# Patient Record
Sex: FEMALE | Race: WHITE | Employment: OTHER | URBAN - METROPOLITAN AREA
[De-identification: names, ages, dates, MRNs, and addresses within clinical notes are randomized per-mention and may not be internally consistent; named-entity substitution may affect disease eponyms.]

---

## 2017-06-14 ENCOUNTER — HOSPITAL ENCOUNTER (OUTPATIENT)
Dept: GENERAL RADIOLOGY | Age: 67
Discharge: HOME | End: 2017-06-14
Payer: COMMERCIAL

## 2017-06-14 ENCOUNTER — HOSPITAL ENCOUNTER (OUTPATIENT)
Age: 67
Discharge: HOME | End: 2017-06-14
Payer: COMMERCIAL

## 2017-06-14 DIAGNOSIS — J40 BRONCHITIS: ICD-10-CM

## 2017-06-14 PROCEDURE — 71020 XR CHEST STANDARD TWO VW: CPT

## 2017-07-13 ENCOUNTER — HOSPITAL ENCOUNTER (OUTPATIENT)
Dept: CT IMAGING | Age: 67
Discharge: HOME | End: 2017-07-13
Payer: COMMERCIAL

## 2017-07-13 DIAGNOSIS — J90 PLEURAL EFFUSION: ICD-10-CM

## 2017-07-13 PROCEDURE — 71250 CT THORAX DX C-: CPT

## 2018-01-29 ENCOUNTER — HOSPITAL ENCOUNTER (OUTPATIENT)
Dept: CT IMAGING | Age: 68
Discharge: HOME | End: 2018-01-29
Payer: MEDICARE

## 2018-01-29 DIAGNOSIS — R91.1 PULMONARY NODULE: ICD-10-CM

## 2018-01-29 PROCEDURE — 71250 CT THORAX DX C-: CPT

## 2018-01-31 ENCOUNTER — HOSPITAL ENCOUNTER (OUTPATIENT)
Age: 68
Discharge: HOME | End: 2018-01-31
Payer: MEDICARE

## 2018-01-31 LAB
ABSOLUTE EOS #: 0.12 K/UL (ref 0–0.4)
ABSOLUTE IMMATURE GRANULOCYTE: ABNORMAL K/UL (ref 0–0.3)
ABSOLUTE LYMPH #: 3.29 K/UL (ref 1–4.8)
ABSOLUTE MONO #: 0.25 K/UL (ref 0.2–0.8)
ALT SERPL-CCNC: 27 U/L (ref 5–33)
ANION GAP SERPL CALCULATED.3IONS-SCNC: 9 MMOL/L (ref 9–17)
AST SERPL-CCNC: 22 U/L
BASOPHILS # BLD: 0 %
BASOPHILS ABSOLUTE: 0 K/UL (ref 0–0.2)
BILIRUBIN URINE: NEGATIVE
BUN BLDV-MCNC: 15 MG/DL (ref 8–23)
BUN/CREAT BLD: 26 (ref 9–20)
CALCIUM SERPL-MCNC: 9.1 MG/DL (ref 8.6–10.4)
CHLORIDE BLD-SCNC: 102 MMOL/L (ref 98–107)
CHOLESTEROL, FASTING: 205 MG/DL
CHOLESTEROL/HDL RATIO: 3.3
CO2: 30 MMOL/L (ref 20–31)
COLOR: YELLOW
COMMENT UA: NORMAL
CREAT SERPL-MCNC: 0.57 MG/DL (ref 0.5–0.9)
DIFFERENTIAL TYPE: ABNORMAL
EOSINOPHILS RELATIVE PERCENT: 2 % (ref 1–4)
GFR AFRICAN AMERICAN: >60 ML/MIN
GFR NON-AFRICAN AMERICAN: >60 ML/MIN
GFR SERPL CREATININE-BSD FRML MDRD: ABNORMAL ML/MIN/{1.73_M2}
GFR SERPL CREATININE-BSD FRML MDRD: ABNORMAL ML/MIN/{1.73_M2}
GGT: 54 U/L (ref 5–36)
GLUCOSE FASTING: 93 MG/DL (ref 70–99)
GLUCOSE URINE: NEGATIVE
HCT VFR BLD CALC: 49.6 % (ref 36–46)
HDLC SERPL-MCNC: 62 MG/DL
HEMOGLOBIN: 16.7 G/DL (ref 12–16)
IMMATURE GRANULOCYTES: ABNORMAL %
KETONES, URINE: NEGATIVE
LDL CHOLESTEROL: 124 MG/DL (ref 0–130)
LEUKOCYTE ESTERASE, URINE: NEGATIVE
LYMPHOCYTES # BLD: 53 % (ref 24–44)
MCH RBC QN AUTO: 31.1 PG (ref 26–34)
MCHC RBC AUTO-ENTMCNC: 33.7 G/DL (ref 31–37)
MCV RBC AUTO: 92.2 FL (ref 80–100)
MONOCYTES # BLD: 4 % (ref 1–7)
NITRITE, URINE: NEGATIVE
NRBC AUTOMATED: ABNORMAL PER 100 WBC
PDW BLD-RTO: 12.4 % (ref 11.5–14.5)
PH UA: 5.5 (ref 5–8)
PLATELET # BLD: 321 K/UL (ref 130–400)
PLATELET ESTIMATE: ABNORMAL
PMV BLD AUTO: ABNORMAL FL (ref 6–12)
POTASSIUM SERPL-SCNC: 4.3 MMOL/L (ref 3.7–5.3)
PROTEIN UA: NEGATIVE
RBC # BLD: 5.38 M/UL (ref 4–5.2)
RBC # BLD: ABNORMAL 10*6/UL
SEG NEUTROPHILS: 41 % (ref 36–66)
SEGMENTED NEUTROPHILS ABSOLUTE COUNT: 2.54 K/UL (ref 1.8–7.7)
SODIUM BLD-SCNC: 141 MMOL/L (ref 135–144)
SPECIFIC GRAVITY UA: 1.01 (ref 1–1.03)
TRIGLYCERIDE, FASTING: 94 MG/DL
TURBIDITY: CLEAR
URINE HGB: NEGATIVE
UROBILINOGEN, URINE: NORMAL
VLDLC SERPL CALC-MCNC: ABNORMAL MG/DL (ref 1–30)
WBC # BLD: 6.2 K/UL (ref 3.5–11)
WBC # BLD: ABNORMAL 10*3/UL

## 2018-01-31 PROCEDURE — 84460 ALANINE AMINO (ALT) (SGPT): CPT

## 2018-01-31 PROCEDURE — 84450 TRANSFERASE (AST) (SGOT): CPT

## 2018-01-31 PROCEDURE — 80048 BASIC METABOLIC PNL TOTAL CA: CPT

## 2018-01-31 PROCEDURE — 81003 URINALYSIS AUTO W/O SCOPE: CPT

## 2018-01-31 PROCEDURE — 80061 LIPID PANEL: CPT

## 2018-01-31 PROCEDURE — 36415 COLL VENOUS BLD VENIPUNCTURE: CPT

## 2018-01-31 PROCEDURE — 82977 ASSAY OF GGT: CPT

## 2018-01-31 PROCEDURE — 85025 COMPLETE CBC W/AUTO DIFF WBC: CPT

## 2018-09-15 ENCOUNTER — HOSPITAL ENCOUNTER (OUTPATIENT)
Dept: CT IMAGING | Age: 68
Discharge: HOME | End: 2018-09-17
Payer: MEDICARE

## 2018-09-15 DIAGNOSIS — R91.1 LUNG NODULE: ICD-10-CM

## 2018-09-15 PROCEDURE — 71250 CT THORAX DX C-: CPT

## 2019-03-01 ENCOUNTER — HOSPITAL ENCOUNTER (OUTPATIENT)
Dept: CT IMAGING | Age: 69
Discharge: HOME | End: 2019-03-03
Payer: MEDICARE

## 2019-03-01 DIAGNOSIS — R91.1 LUNG NODULE: ICD-10-CM

## 2019-03-01 PROCEDURE — 71250 CT THORAX DX C-: CPT

## 2023-05-01 ENCOUNTER — HOSPITAL ENCOUNTER (INPATIENT)
Age: 73
LOS: 3 days | Discharge: HOME | DRG: 291 | End: 2023-05-04
Attending: EMERGENCY MEDICINE | Admitting: INTERNAL MEDICINE
Payer: COMMERCIAL

## 2023-05-01 ENCOUNTER — APPOINTMENT (OUTPATIENT)
Dept: GENERAL RADIOLOGY | Age: 73
DRG: 291 | End: 2023-05-01
Payer: COMMERCIAL

## 2023-05-01 DIAGNOSIS — I48.91 ATRIAL FIBRILLATION WITH RVR (HCC): ICD-10-CM

## 2023-05-01 DIAGNOSIS — I50.9 ACUTE ON CHRONIC CONGESTIVE HEART FAILURE, UNSPECIFIED HEART FAILURE TYPE (HCC): Primary | ICD-10-CM

## 2023-05-01 PROBLEM — Z72.0 TOBACCO ABUSE: Status: ACTIVE | Noted: 2023-05-01

## 2023-05-01 PROBLEM — E78.2 MIXED HYPERLIPIDEMIA: Status: ACTIVE | Noted: 2023-05-01

## 2023-05-01 PROBLEM — R71.8 HIGH HEMATOCRIT: Status: ACTIVE | Noted: 2023-05-01

## 2023-05-01 PROBLEM — I10 PRIMARY HYPERTENSION: Status: ACTIVE | Noted: 2023-05-01

## 2023-05-01 PROBLEM — J42 CHRONIC BRONCHITIS (HCC): Status: ACTIVE | Noted: 2023-05-01

## 2023-05-01 PROBLEM — E66.01 CLASS 3 SEVERE OBESITY DUE TO EXCESS CALORIES WITH SERIOUS COMORBIDITY AND BODY MASS INDEX (BMI) OF 40.0 TO 44.9 IN ADULT (HCC): Status: ACTIVE | Noted: 2023-05-01

## 2023-05-01 PROBLEM — I50.31 ACUTE DIASTOLIC HEART FAILURE (HCC): Status: ACTIVE | Noted: 2023-05-01

## 2023-05-01 LAB
ABSOLUTE EOS #: 0.07 K/UL (ref 0–0.44)
ABSOLUTE IMMATURE GRANULOCYTE: 0.02 K/UL (ref 0–0.3)
ABSOLUTE LYMPH #: 2.06 K/UL (ref 1.1–3.7)
ABSOLUTE MONO #: 0.59 K/UL (ref 0.1–1.2)
ALBUMIN SERPL-MCNC: 3.5 G/DL (ref 3.5–5.2)
ALP SERPL-CCNC: 138 U/L (ref 35–104)
ALT SERPL-CCNC: 82 U/L (ref 5–33)
ANION GAP SERPL CALCULATED.3IONS-SCNC: 6 MMOL/L (ref 9–17)
AST SERPL-CCNC: 32 U/L
BASOPHILS # BLD: 1 % (ref 0–2)
BASOPHILS ABSOLUTE: 0.05 K/UL (ref 0–0.2)
BILIRUB DIRECT SERPL-MCNC: 0.3 MG/DL
BILIRUB INDIRECT SERPL-MCNC: 0.5 MG/DL (ref 0–1)
BILIRUB SERPL-MCNC: 0.8 MG/DL (ref 0.3–1.2)
BNP SERPL-MCNC: 3613 PG/ML
BUN SERPL-MCNC: 14 MG/DL (ref 8–23)
BUN/CREAT BLD: 17 (ref 9–20)
CALCIUM SERPL-MCNC: 9 MG/DL (ref 8.6–10.4)
CHLORIDE SERPL-SCNC: 104 MMOL/L (ref 98–107)
CO2 SERPL-SCNC: 29 MMOL/L (ref 20–31)
CREAT SERPL-MCNC: 0.83 MG/DL (ref 0.5–0.9)
EOSINOPHILS RELATIVE PERCENT: 1 % (ref 1–4)
GFR SERPL CREATININE-BSD FRML MDRD: >60 ML/MIN/1.73M2
GLUCOSE SERPL-MCNC: 120 MG/DL (ref 70–99)
HCT VFR BLD AUTO: 51 % (ref 36.3–47.1)
HGB BLD-MCNC: 16 G/DL (ref 11.9–15.1)
IMMATURE GRANULOCYTES: 0 %
LYMPHOCYTES # BLD: 25 % (ref 24–43)
MAGNESIUM SERPL-MCNC: 1.9 MG/DL (ref 1.6–2.6)
MCH RBC QN AUTO: 30.8 PG (ref 25.2–33.5)
MCHC RBC AUTO-ENTMCNC: 31.4 G/DL (ref 28.4–34.8)
MCV RBC AUTO: 98.1 FL (ref 82.6–102.9)
MONOCYTES # BLD: 7 % (ref 3–12)
NRBC AUTOMATED: 0 PER 100 WBC
PDW BLD-RTO: 13.6 % (ref 11.8–14.4)
PLATELET # BLD AUTO: 291 K/UL (ref 138–453)
PMV BLD AUTO: 9.2 FL (ref 8.1–13.5)
POTASSIUM SERPL-SCNC: 4.4 MMOL/L (ref 3.7–5.3)
PROT SERPL-MCNC: 6.5 G/DL (ref 6.4–8.3)
RBC # BLD: 5.2 M/UL (ref 3.95–5.11)
SEG NEUTROPHILS: 66 % (ref 36–65)
SEGMENTED NEUTROPHILS ABSOLUTE COUNT: 5.56 K/UL (ref 1.5–8.1)
SODIUM SERPL-SCNC: 139 MMOL/L (ref 135–144)
TROPONIN I SERPL DL<=0.01 NG/ML-MCNC: 11 NG/L (ref 0–14)
TROPONIN I SERPL DL<=0.01 NG/ML-MCNC: 12 NG/L (ref 0–14)
TSH SERPL-ACNC: 1.91 UIU/ML (ref 0.3–5)
WBC # BLD AUTO: 8.4 K/UL (ref 3.5–11.3)

## 2023-05-01 PROCEDURE — 71045 X-RAY EXAM CHEST 1 VIEW: CPT

## 2023-05-01 PROCEDURE — 83880 ASSAY OF NATRIURETIC PEPTIDE: CPT

## 2023-05-01 PROCEDURE — 80048 BASIC METABOLIC PNL TOTAL CA: CPT

## 2023-05-01 PROCEDURE — 84484 ASSAY OF TROPONIN QUANT: CPT

## 2023-05-01 PROCEDURE — 2580000003 HC RX 258: Performed by: INTERNAL MEDICINE

## 2023-05-01 PROCEDURE — 2060000000 HC ICU INTERMEDIATE R&B

## 2023-05-01 PROCEDURE — 85025 COMPLETE CBC W/AUTO DIFF WBC: CPT

## 2023-05-01 PROCEDURE — 6360000002 HC RX W HCPCS: Performed by: EMERGENCY MEDICINE

## 2023-05-01 PROCEDURE — 6360000002 HC RX W HCPCS: Performed by: INTERNAL MEDICINE

## 2023-05-01 PROCEDURE — 84443 ASSAY THYROID STIM HORMONE: CPT

## 2023-05-01 PROCEDURE — 80076 HEPATIC FUNCTION PANEL: CPT

## 2023-05-01 PROCEDURE — 83735 ASSAY OF MAGNESIUM: CPT

## 2023-05-01 PROCEDURE — 99285 EMERGENCY DEPT VISIT HI MDM: CPT

## 2023-05-01 PROCEDURE — 93005 ELECTROCARDIOGRAM TRACING: CPT | Performed by: EMERGENCY MEDICINE

## 2023-05-01 PROCEDURE — 99233 SBSQ HOSP IP/OBS HIGH 50: CPT | Performed by: INTERNAL MEDICINE

## 2023-05-01 PROCEDURE — 6370000000 HC RX 637 (ALT 250 FOR IP): Performed by: INTERNAL MEDICINE

## 2023-05-01 RX ORDER — POTASSIUM CHLORIDE 7.45 MG/ML
10 INJECTION INTRAVENOUS PRN
Status: DISCONTINUED | OUTPATIENT
Start: 2023-05-01 | End: 2023-05-04 | Stop reason: HOSPADM

## 2023-05-01 RX ORDER — ACETAMINOPHEN 325 MG/1
650 TABLET ORAL EVERY 6 HOURS PRN
Status: DISCONTINUED | OUTPATIENT
Start: 2023-05-01 | End: 2023-05-04 | Stop reason: HOSPADM

## 2023-05-01 RX ORDER — POLYETHYLENE GLYCOL 3350 17 G/17G
17 POWDER, FOR SOLUTION ORAL DAILY PRN
Status: DISCONTINUED | OUTPATIENT
Start: 2023-05-01 | End: 2023-05-04 | Stop reason: HOSPADM

## 2023-05-01 RX ORDER — DIGOXIN 0.25 MG/ML
250 INJECTION INTRAMUSCULAR; INTRAVENOUS
Status: COMPLETED | OUTPATIENT
Start: 2023-05-01 | End: 2023-05-01

## 2023-05-01 RX ORDER — SODIUM CHLORIDE 0.9 % (FLUSH) 0.9 %
5-40 SYRINGE (ML) INJECTION EVERY 12 HOURS SCHEDULED
Status: DISCONTINUED | OUTPATIENT
Start: 2023-05-01 | End: 2023-05-04 | Stop reason: HOSPADM

## 2023-05-01 RX ORDER — ATORVASTATIN CALCIUM 20 MG/1
20 TABLET, FILM COATED ORAL DAILY
COMMUNITY

## 2023-05-01 RX ORDER — ATORVASTATIN CALCIUM 20 MG/1
20 TABLET, FILM COATED ORAL DAILY
Status: DISCONTINUED | OUTPATIENT
Start: 2023-05-01 | End: 2023-05-04 | Stop reason: HOSPADM

## 2023-05-01 RX ORDER — LISINOPRIL 5 MG/1
5 TABLET ORAL DAILY
Status: DISCONTINUED | OUTPATIENT
Start: 2023-05-01 | End: 2023-05-01

## 2023-05-01 RX ORDER — IPRATROPIUM BROMIDE AND ALBUTEROL SULFATE 2.5; .5 MG/3ML; MG/3ML
1 SOLUTION RESPIRATORY (INHALATION) EVERY 4 HOURS PRN
Status: DISCONTINUED | OUTPATIENT
Start: 2023-05-01 | End: 2023-05-01

## 2023-05-01 RX ORDER — SODIUM CHLORIDE 9 MG/ML
INJECTION, SOLUTION INTRAVENOUS PRN
Status: DISCONTINUED | OUTPATIENT
Start: 2023-05-01 | End: 2023-05-04 | Stop reason: HOSPADM

## 2023-05-01 RX ORDER — ACETAMINOPHEN 650 MG/1
650 SUPPOSITORY RECTAL EVERY 6 HOURS PRN
Status: DISCONTINUED | OUTPATIENT
Start: 2023-05-01 | End: 2023-05-04 | Stop reason: HOSPADM

## 2023-05-01 RX ORDER — SODIUM CHLORIDE 0.9 % (FLUSH) 0.9 %
5-40 SYRINGE (ML) INJECTION PRN
Status: DISCONTINUED | OUTPATIENT
Start: 2023-05-01 | End: 2023-05-04 | Stop reason: HOSPADM

## 2023-05-01 RX ORDER — ACETAMINOPHEN 500 MG
500 TABLET ORAL EVERY 6 HOURS PRN
COMMUNITY

## 2023-05-01 RX ORDER — METOPROLOL TARTRATE 100 MG/1
100 TABLET ORAL 2 TIMES DAILY
Status: ON HOLD | COMMUNITY
End: 2023-05-04 | Stop reason: HOSPADM

## 2023-05-01 RX ORDER — ALBUTEROL SULFATE 90 UG/1
2 AEROSOL, METERED RESPIRATORY (INHALATION) EVERY 4 HOURS PRN
Status: DISCONTINUED | OUTPATIENT
Start: 2023-05-01 | End: 2023-05-04 | Stop reason: HOSPADM

## 2023-05-01 RX ORDER — LORAZEPAM 0.5 MG/1
0.5 TABLET ORAL EVERY 4 HOURS PRN
Status: DISCONTINUED | OUTPATIENT
Start: 2023-05-01 | End: 2023-05-04 | Stop reason: HOSPADM

## 2023-05-01 RX ORDER — POTASSIUM CHLORIDE 20 MEQ/1
40 TABLET, EXTENDED RELEASE ORAL PRN
Status: DISCONTINUED | OUTPATIENT
Start: 2023-05-01 | End: 2023-05-04 | Stop reason: HOSPADM

## 2023-05-01 RX ORDER — ENOXAPARIN SODIUM 150 MG/ML
1 INJECTION SUBCUTANEOUS 2 TIMES DAILY
Status: DISCONTINUED | OUTPATIENT
Start: 2023-05-01 | End: 2023-05-02

## 2023-05-01 RX ORDER — FUROSEMIDE 10 MG/ML
40 INJECTION INTRAMUSCULAR; INTRAVENOUS 3 TIMES DAILY
Status: DISCONTINUED | OUTPATIENT
Start: 2023-05-01 | End: 2023-05-02

## 2023-05-01 RX ORDER — ALBUTEROL SULFATE 90 UG/1
2 AEROSOL, METERED RESPIRATORY (INHALATION) EVERY 6 HOURS PRN
COMMUNITY

## 2023-05-01 RX ORDER — FUROSEMIDE 10 MG/ML
40 INJECTION INTRAMUSCULAR; INTRAVENOUS 2 TIMES DAILY
Status: DISCONTINUED | OUTPATIENT
Start: 2023-05-01 | End: 2023-05-01

## 2023-05-01 RX ORDER — FUROSEMIDE 10 MG/ML
40 INJECTION INTRAMUSCULAR; INTRAVENOUS ONCE
Status: COMPLETED | OUTPATIENT
Start: 2023-05-01 | End: 2023-05-01

## 2023-05-01 RX ORDER — ONDANSETRON 4 MG/1
4 TABLET, ORALLY DISINTEGRATING ORAL EVERY 8 HOURS PRN
Status: DISCONTINUED | OUTPATIENT
Start: 2023-05-01 | End: 2023-05-04 | Stop reason: HOSPADM

## 2023-05-01 RX ORDER — METOPROLOL TARTRATE 100 MG/1
100 TABLET ORAL 2 TIMES DAILY
Status: DISCONTINUED | OUTPATIENT
Start: 2023-05-01 | End: 2023-05-02

## 2023-05-01 RX ORDER — METOPROLOL TARTRATE 5 MG/5ML
5 INJECTION INTRAVENOUS ONCE
Status: DISCONTINUED | OUTPATIENT
Start: 2023-05-01 | End: 2023-05-01

## 2023-05-01 RX ORDER — DIGOXIN 125 MCG
125 TABLET ORAL DAILY
Status: DISCONTINUED | OUTPATIENT
Start: 2023-05-02 | End: 2023-05-02

## 2023-05-01 RX ORDER — MAGNESIUM SULFATE IN WATER 40 MG/ML
2000 INJECTION, SOLUTION INTRAVENOUS PRN
Status: DISCONTINUED | OUTPATIENT
Start: 2023-05-01 | End: 2023-05-04 | Stop reason: HOSPADM

## 2023-05-01 RX ORDER — ENOXAPARIN SODIUM 100 MG/ML
30 INJECTION SUBCUTANEOUS 2 TIMES DAILY
Status: DISCONTINUED | OUTPATIENT
Start: 2023-05-01 | End: 2023-05-01

## 2023-05-01 RX ORDER — ONDANSETRON 2 MG/ML
4 INJECTION INTRAMUSCULAR; INTRAVENOUS EVERY 6 HOURS PRN
Status: DISCONTINUED | OUTPATIENT
Start: 2023-05-01 | End: 2023-05-03

## 2023-05-01 RX ADMIN — METOPROLOL 100 MG: 100 TABLET ORAL at 20:00

## 2023-05-01 RX ADMIN — SODIUM CHLORIDE, PRESERVATIVE FREE 10 ML: 5 INJECTION INTRAVENOUS at 20:00

## 2023-05-01 RX ADMIN — DIGOXIN 250 MCG: 0.25 INJECTION INTRAMUSCULAR; INTRAVENOUS at 14:46

## 2023-05-01 RX ADMIN — EMPAGLIFLOZIN 10 MG: 10 TABLET, FILM COATED ORAL at 15:13

## 2023-05-01 RX ADMIN — DIGOXIN 250 MCG: 0.25 INJECTION INTRAMUSCULAR; INTRAVENOUS at 16:44

## 2023-05-01 RX ADMIN — FUROSEMIDE 40 MG: 10 INJECTION, SOLUTION INTRAMUSCULAR; INTRAVENOUS at 17:37

## 2023-05-01 RX ADMIN — ENOXAPARIN SODIUM 120 MG: 150 INJECTION SUBCUTANEOUS at 20:00

## 2023-05-01 RX ADMIN — FUROSEMIDE 40 MG: 10 INJECTION, SOLUTION INTRAMUSCULAR; INTRAVENOUS at 12:57

## 2023-05-01 ASSESSMENT — ENCOUNTER SYMPTOMS
VOMITING: 0
DIARRHEA: 0
EYE DISCHARGE: 0
SORE THROAT: 0
NAUSEA: 0
RHINORRHEA: 0
SHORTNESS OF BREATH: 1
EYE REDNESS: 0
COLOR CHANGE: 0
COUGH: 0

## 2023-05-01 ASSESSMENT — PAIN - FUNCTIONAL ASSESSMENT: PAIN_FUNCTIONAL_ASSESSMENT: NONE - DENIES PAIN

## 2023-05-01 NOTE — PROGRESS NOTES
[x] Medication Reconciliation was completed and the patient's home medication list was verified. The Med List Status is \"Complete\". The following sources were used to assist with Medication Reconciliation:    [] Patient had a list of medications which was transcribed into the EHR. [] Patient provided bottles of their medications    [x] Home medications reviewed and confirmed with pt    [] Contacted patient's pharmacy to confirm home medications    [] Contacted patient's physician office to confirm home medications    [x] Medical Records from another facility and/or Care Everywhere were reviewed  - verified with care everywhere for medication doses.

## 2023-05-01 NOTE — ED PROVIDER NOTES
EMERGENCY DEPARTMENT ENCOUNTER    Pt Name: Fernanda Wolfe  MRN: [id number]  [place] 1950  Date of evaluation: 5/1/23  CHIEF COMPLAINT       Chief Complaint   Patient presents with    Shortness of Breath     States smokes a pack of cig a day    Atrial Fibrillation     Diagnosed  March 17, taking lopressor and eliquis    Swelling     Legs/hips     HISTORY OF PRESENT ILLNESS   This is a 74-year-old female, the patient is a pack-a-day smoker, she presents today with complaints of shortness of breath. Patient describes her shortness of breath as severe, without any specific alleviating factors. She states that over the past few days she has had to sleep sitting up, she states that she also has some paroxysmal nocturnal dyspnea. REVIEW OF SYSTEMS     Review of Systems   Constitutional:  Negative for chills and fever. HENT:  Negative for rhinorrhea and sore throat. Eyes:  Negative for discharge, redness and visual disturbance. Respiratory:  Positive for shortness of breath. Negative for cough. Cardiovascular:  Positive for leg swelling. Negative for chest pain and palpitations. Gastrointestinal:  Negative for diarrhea, nausea and vomiting. Genitourinary:  Negative for dysuria and hematuria. Musculoskeletal:  Negative for arthralgias, myalgias and neck pain. Skin:  Negative for color change and rash. Neurological:  Negative for seizures, weakness and headaches. Psychiatric/Behavioral:  Negative for hallucinations, self-injury and suicidal ideas.     PASTMEDICAL HISTORY     Past Medical History:   Diagnosis Date    A-fib ([facility] [address]     Hyperlipidemia     Hypertension      Past Problem List  Patient Active Problem List   Diagnosis Code    Decompensated heart failure ([facility] [address] I50.9     SURGICAL HISTORY       Past Surgical History:   Procedure Laterality Date    HERNIA REPAIR      HYSTERECTOMY (CERVIX STATUS UNKNOWN)       CURRENT MEDICATIONS       Previous Medications    No medications on file

## 2023-05-01 NOTE — PROGRESS NOTES
Nutrition Education    Educated on Heart failure education given at bedside, talked about importance of taking their weight daily, fluid restriction and sodium restriction. Learners: Patient  Readiness: Eager  Method: Explanation and Handout  Response: Verbalizes Understanding  Contact name and number provided.     Lauren De Anda RD  Contact Number: [phone]

## 2023-05-01 NOTE — PLAN OF CARE
Pt admit with CHF exacerbation. Pt has 3+ edema from BLE up to waist, and is on IV lasix for diuresis. Pt also in a-fib RVR with a HR from 110-150, and 2 doses of IV digoxin were given. HR now 80-100s. Pt does have a history of A-fib, and was on eliquis at home but on lovenox here in case a procedure needs to be done. Pt up per self, and voiding in hat in toilet for I&O documentation. Problem: Cardiovascular - Adult  Goal: Maintains optimal cardiac output and hemodynamic stability  Outcome: Progressing  Pt has good output d/t IV diuretics. Pt has diminished breath sounds on the right, but oxygen saturation is good on room air. Will continue to monitor.

## 2023-05-01 NOTE — RT PROTOCOL NOTE
RT Inhaler-Nebulizer Bronchodilator Protocol Note    There is a bronchodilator order in the chart from a provider indicating to follow the RT Bronchodilator Protocol and there is an Initiate RT Inhaler-Nebulizer Bronchodilator Protocol order as well (see protocol at bottom of note). CXR Findings:  XR CHEST PORTABLE    Result Date: 5/1/2023  Cardiomegaly Right-sided pleural effusion No overt pulmonary edema       The findings from the last RT Protocol Assessment were as follows:   History Pulmonary Disease: Smoker 15 pack years or more  Respiratory Pattern: Regular pattern and RR 12-20 bpm  Breath Sounds: Slightly diminished and/or crackles  Cough: Strong, spontaneous, non-productive  Indication for Bronchodilator Therapy:    Bronchodilator Assessment Score: 3    Aerosolized bronchodilator medication orders have been revised according to the RT Inhaler-Nebulizer Bronchodilator Protocol below. Respiratory Therapist to perform RT Therapy Protocol Assessment initially then follow the protocol. Repeat RT Therapy Protocol Assessment PRN for score 0-3 or on second treatment, BID, and PRN for scores above 3. No Indications - adjust the frequency to every 6 hours PRN wheezing or bronchospasm, if no treatments needed after 48 hours then discontinue using Per Protocol order mode. If indication present, adjust the RT bronchodilator orders based on the Bronchodilator Assessment Score as indicated below. Use Inhaler orders unless patient has one or more of the following: on home nebulizer, not able to hold breath for 10 seconds, is not alert and oriented, cannot activate and use MDI correctly, or respiratory rate 25 breaths per minute or more, then use the equivalent nebulizer order(s) with same Frequency and PRN reasons based on the score. If a patient is on this medication at home then do not decrease Frequency below that used at home.     0-3 - enter or revise RT bronchodilator order(s) to equivalent RT

## 2023-05-01 NOTE — H&P
[facility]  Office: [address], DO, Juliana Solid, DO, Kerry Shall, DO, Zhane Gosselin Blood, DO, Nicole Holder MD, Angie Milan MD, Sal Weir MD, Quinten Del Cid MD,  Freddy Torres MD, Kitty Palm MD, Heavenly Sales, DO, Connor Borges MD,  Samra Reyez MD, Danika Lamar MD, Maira Perez DO, Megan Luna MD, Dilma Gutierrez MD, Cuco Kiser DO, Kaden Cedeño MD, Shonda William MD, Gerda Childress MD, [address] [facility], MD,  Dionne Bernabe DO, Harpreet Bautista MD,  Elzbieta Hein CNP,  Carolina Delatorre, CNP, James Mcelroy, CNP, Xi Franco, CNP,  Romelia Senior, [facility], Paula Paniagua, CNP, Stefano Gutierrez, CNP, Nhi Gudino, CNP, Blair Meek, CNP, Dmitry Ridley, CNP, Ant Cavanaugh PAHAL, Bing Nair, CNS, Ti Felipe, CNP, Kelsi Serrano, [facility]    HISTORY AND PHYSICAL EXAMINATION            Date:   5/1/2023  Patient name:  Laquita Villa  Date of admission:  5/1/2023 10:33 AM  MRN:   [id number]  Account:  [de-identified]  YOB: 1950  PCP:    Tristen Ulloa DO  Room:   [address] [place] [address]  Code Status:    Full Code    Chief Complaint:     Chief Complaint   Patient presents with    Shortness of Breath     States smokes a pack of cig a day    Atrial Fibrillation     Diagnosed  March 17, taking lopressor and eliquis    Swelling     Legs/hips       History Obtained From:     patient, electronic medical record    History of Present Illness:     Laquita Villa is a 81 y.o. for evaluation of shortness of breath and bilateral lower exteremity edema. Symptoms started on April 21st. She says she weighs herself daily and noticed a 25 pound weight gain and is now having dyspnea with exertion and now has to sleep in a recliner. . She says she can only walk about 5-10 feet at home before having to stop and catch her breath.  Symptoms are improved at rest. She does not have any chest

## 2023-05-01 NOTE — PROGRESS NOTES
Patient transferred to 1004 by cart . Belongings and medications with the patient. Family aware of the transfer. Condition satisfactory upon transfer.

## 2023-05-01 NOTE — ED NOTES
ED to inpatient nurses report     Chief Complaint   Patient presents with    Shortness of Breath     States smokes a pack of cig a day    Atrial Fibrillation     Diagnosed  March 17, taking lopressor and eliquis    Swelling     Legs/hips      Present to ED from home  LOC: alert and orientated to name, place, date  Vital signs   Vitals:    05/01/23 1027 05/01/23 1152 05/01/23 1153 05/01/23 1225   BP: (!) 140/116      Pulse: 55 (!) 108  (!) 118   Resp: 20      Temp: 98.1 °F (36.7 °C)      TempSrc: Oral      SpO2: 92% 93% 94% 94%   Weight: 265 lb (120.2 kg)      Height: 5' 7\" (1.702 m)         Oxygen Baseline room air    Current needs required none   SEPSIS:    [] Lactate X 2 ordered (Yes or No)  [] Antibiotics given (Yes or No)  [] IV Fluids ordered (Yes or No)             [] 2nd IV completed (Yes or No)  [] Hourly Vital Signs (Validated)  [] Outstanding Orders:     LDAs:   Peripheral IV 05/01/23 Left Antecubital (Active)   Site Assessment Clean, dry & intact 05/01/23 1058     Mobility: Independent  Fall Risk:    Pending ED orders: none  Present condition: stable  Code Status: unknown  Consults: IP CONSULT TO HOSPITALIST  IP CONSULT TO HEART FAILURE NURSE/COORDINATOR  IP CONSULT TO DIETITIAN  []  Hospitalist  Completed  [] yes [] no Who:   []  Medicine  Completed  [] yes [] No Who:   []  Cardiology  Completed  [] yes [] No Who:   []  GI   Completed  [] yes [] No Who:   []  Neurology  Completed  [] yes [] No Who:   []  Nephrology Completed  [] yes [] No Who:    []  Vascular  Completed  [] yes [] No Who:   []  Ortho  Completed  [] yes [] No Who:     []  Surgery  Completed  [] yes [] No Who:    []  Urology  Completed  [] yes [] No Who:    []  CT Surgery Completed  [] yes [] No Who:   []  Podiatry  Completed  [] yes [] No Who:    []  Other    Completed  [] yes [] No Who:  Interventions: IV, blood work, XR, meds  Important Events: SOB upon exertion        Electronically signed by Aureliano Muller RN on 5/1/2023 at 12:43 PM

## 2023-05-02 ENCOUNTER — APPOINTMENT (OUTPATIENT)
Dept: NUCLEAR MEDICINE | Age: 73
DRG: 291 | End: 2023-05-02
Payer: COMMERCIAL

## 2023-05-02 LAB
ABSOLUTE EOS #: 0.13 K/UL (ref 0–0.44)
ABSOLUTE IMMATURE GRANULOCYTE: 0.02 K/UL (ref 0–0.3)
ABSOLUTE LYMPH #: 2.65 K/UL (ref 1.1–3.7)
ABSOLUTE MONO #: 0.78 K/UL (ref 0.1–1.2)
ANION GAP SERPL CALCULATED.3IONS-SCNC: 8 MMOL/L (ref 9–17)
BASOPHILS # BLD: 1 % (ref 0–2)
BASOPHILS ABSOLUTE: 0.06 K/UL (ref 0–0.2)
BUN SERPL-MCNC: 14 MG/DL (ref 8–23)
BUN/CREAT BLD: 18 (ref 9–20)
CALCIUM SERPL-MCNC: 8.8 MG/DL (ref 8.6–10.4)
CHLORIDE SERPL-SCNC: 105 MMOL/L (ref 98–107)
CO2 SERPL-SCNC: 31 MMOL/L (ref 20–31)
CREAT SERPL-MCNC: 0.77 MG/DL (ref 0.5–0.9)
EKG ATRIAL RATE: 73 BPM
EKG Q-T INTERVAL: 286 MS
EKG QRS DURATION: 84 MS
EKG QTC CALCULATION (BAZETT): 392 MS
EKG R AXIS: 95 DEGREES
EKG T AXIS: 18 DEGREES
EKG VENTRICULAR RATE: 113 BPM
EOSINOPHILS RELATIVE PERCENT: 2 % (ref 1–4)
GFR SERPL CREATININE-BSD FRML MDRD: >60 ML/MIN/1.73M2
GLUCOSE SERPL-MCNC: 94 MG/DL (ref 70–99)
HCT VFR BLD AUTO: 47.4 % (ref 36.3–47.1)
HGB BLD-MCNC: 15.1 G/DL (ref 11.9–15.1)
IMMATURE GRANULOCYTES: 0 %
LV EF: 55 %
LVEF MODALITY: NORMAL
LYMPHOCYTES # BLD: 36 % (ref 24–43)
MAGNESIUM SERPL-MCNC: 2 MG/DL (ref 1.6–2.6)
MCH RBC QN AUTO: 30.8 PG (ref 25.2–33.5)
MCHC RBC AUTO-ENTMCNC: 31.9 G/DL (ref 28.4–34.8)
MCV RBC AUTO: 96.5 FL (ref 82.6–102.9)
MONOCYTES # BLD: 11 % (ref 3–12)
NRBC AUTOMATED: 0 PER 100 WBC
PDW BLD-RTO: 13.4 % (ref 11.8–14.4)
PLATELET # BLD AUTO: 243 K/UL (ref 138–453)
PMV BLD AUTO: 8.9 FL (ref 8.1–13.5)
POTASSIUM SERPL-SCNC: 3.7 MMOL/L (ref 3.7–5.3)
RBC # BLD: 4.91 M/UL (ref 3.95–5.11)
SEG NEUTROPHILS: 50 % (ref 36–65)
SEGMENTED NEUTROPHILS ABSOLUTE COUNT: 3.82 K/UL (ref 1.5–8.1)
SODIUM SERPL-SCNC: 144 MMOL/L (ref 135–144)
WBC # BLD AUTO: 7.5 K/UL (ref 3.5–11.3)

## 2023-05-02 PROCEDURE — 80048 BASIC METABOLIC PNL TOTAL CA: CPT

## 2023-05-02 PROCEDURE — 6360000002 HC RX W HCPCS: Performed by: INTERNAL MEDICINE

## 2023-05-02 PROCEDURE — 2580000003 HC RX 258: Performed by: INTERNAL MEDICINE

## 2023-05-02 PROCEDURE — 6370000000 HC RX 637 (ALT 250 FOR IP): Performed by: INTERNAL MEDICINE

## 2023-05-02 PROCEDURE — A9500 TC99M SESTAMIBI: HCPCS | Performed by: INTERNAL MEDICINE

## 2023-05-02 PROCEDURE — 3430000000 HC RX DIAGNOSTIC RADIOPHARMACEUTICAL: Performed by: INTERNAL MEDICINE

## 2023-05-02 PROCEDURE — 2060000000 HC ICU INTERMEDIATE R&B

## 2023-05-02 PROCEDURE — 85025 COMPLETE CBC W/AUTO DIFF WBC: CPT

## 2023-05-02 PROCEDURE — 78452 HT MUSCLE IMAGE SPECT MULT: CPT

## 2023-05-02 PROCEDURE — 83735 ASSAY OF MAGNESIUM: CPT

## 2023-05-02 PROCEDURE — 99232 SBSQ HOSP IP/OBS MODERATE 35: CPT | Performed by: INTERNAL MEDICINE

## 2023-05-02 PROCEDURE — 36415 COLL VENOUS BLD VENIPUNCTURE: CPT

## 2023-05-02 PROCEDURE — 93017 CV STRESS TEST TRACING ONLY: CPT

## 2023-05-02 RX ORDER — TETRAKIS(2-METHOXYISOBUTYLISOCYANIDE)COPPER(I) TETRAFLUOROBORATE 1 MG/ML
41.3 INJECTION, POWDER, LYOPHILIZED, FOR SOLUTION INTRAVENOUS
Status: COMPLETED | OUTPATIENT
Start: 2023-05-02 | End: 2023-05-02

## 2023-05-02 RX ORDER — ALBUTEROL SULFATE 90 UG/1
2 AEROSOL, METERED RESPIRATORY (INHALATION) PRN
Status: ACTIVE | OUTPATIENT
Start: 2023-05-02 | End: 2023-05-02

## 2023-05-02 RX ORDER — TETRAKIS(2-METHOXYISOBUTYLISOCYANIDE)COPPER(I) TETRAFLUOROBORATE 1 MG/ML
19.6 INJECTION, POWDER, LYOPHILIZED, FOR SOLUTION INTRAVENOUS
Status: COMPLETED | OUTPATIENT
Start: 2023-05-02 | End: 2023-05-02

## 2023-05-02 RX ORDER — ALBUTEROL SULFATE 1.25 MG/3ML
1 SOLUTION RESPIRATORY (INHALATION) EVERY 6 HOURS PRN
COMMUNITY

## 2023-05-02 RX ORDER — METOPROLOL TARTRATE 5 MG/5ML
5 INJECTION INTRAVENOUS EVERY 5 MIN PRN
Status: ACTIVE | OUTPATIENT
Start: 2023-05-02 | End: 2023-05-02

## 2023-05-02 RX ORDER — SODIUM CHLORIDE 9 MG/ML
500 INJECTION, SOLUTION INTRAVENOUS CONTINUOUS PRN
Status: ACTIVE | OUTPATIENT
Start: 2023-05-02 | End: 2023-05-02

## 2023-05-02 RX ORDER — SOTALOL HYDROCHLORIDE 80 MG/1
80 TABLET ORAL 2 TIMES DAILY
Status: DISCONTINUED | OUTPATIENT
Start: 2023-05-02 | End: 2023-05-03

## 2023-05-02 RX ORDER — FUROSEMIDE 10 MG/ML
40 INJECTION INTRAMUSCULAR; INTRAVENOUS 3 TIMES DAILY
Status: DISCONTINUED | OUTPATIENT
Start: 2023-05-02 | End: 2023-05-02

## 2023-05-02 RX ORDER — SODIUM CHLORIDE 0.9 % (FLUSH) 0.9 %
5-40 SYRINGE (ML) INJECTION PRN
Status: ACTIVE | OUTPATIENT
Start: 2023-05-02 | End: 2023-05-02

## 2023-05-02 RX ORDER — AMINOPHYLLINE DIHYDRATE 25 MG/ML
50 INJECTION, SOLUTION INTRAVENOUS PRN
Status: ACTIVE | OUTPATIENT
Start: 2023-05-02 | End: 2023-05-02

## 2023-05-02 RX ORDER — POTASSIUM CHLORIDE 20 MEQ/1
40 TABLET, EXTENDED RELEASE ORAL ONCE
Status: COMPLETED | OUTPATIENT
Start: 2023-05-02 | End: 2023-05-02

## 2023-05-02 RX ORDER — FUROSEMIDE 10 MG/ML
40 INJECTION INTRAMUSCULAR; INTRAVENOUS 3 TIMES DAILY
Status: DISCONTINUED | OUTPATIENT
Start: 2023-05-02 | End: 2023-05-03

## 2023-05-02 RX ORDER — NITROGLYCERIN 0.4 MG/1
0.4 TABLET SUBLINGUAL EVERY 5 MIN PRN
Status: ACTIVE | OUTPATIENT
Start: 2023-05-02 | End: 2023-05-02

## 2023-05-02 RX ORDER — THERMOMETER, ELECTRONIC,ORAL
EACH MISCELLANEOUS DAILY PRN
COMMUNITY

## 2023-05-02 RX ORDER — ATROPINE SULFATE 0.1 MG/ML
0.5 INJECTION INTRAVENOUS EVERY 5 MIN PRN
Status: ACTIVE | OUTPATIENT
Start: 2023-05-02 | End: 2023-05-02

## 2023-05-02 RX ADMIN — APIXABAN 5 MG: 5 TABLET, FILM COATED ORAL at 10:58

## 2023-05-02 RX ADMIN — SOTALOL HYDROCHLORIDE 80 MG: 80 TABLET ORAL at 20:11

## 2023-05-02 RX ADMIN — REGADENOSON 0.4 MG: 0.08 INJECTION, SOLUTION INTRAVENOUS at 09:19

## 2023-05-02 RX ADMIN — Medication 41.3 MILLICURIE: at 12:50

## 2023-05-02 RX ADMIN — POTASSIUM CHLORIDE 40 MEQ: 1500 TABLET, EXTENDED RELEASE ORAL at 10:58

## 2023-05-02 RX ADMIN — SODIUM CHLORIDE, PRESERVATIVE FREE 10 ML: 5 INJECTION INTRAVENOUS at 10:58

## 2023-05-02 RX ADMIN — FUROSEMIDE 40 MG: 10 INJECTION, SOLUTION INTRAMUSCULAR; INTRAVENOUS at 15:34

## 2023-05-02 RX ADMIN — ATORVASTATIN CALCIUM 20 MG: 20 TABLET, FILM COATED ORAL at 20:11

## 2023-05-02 RX ADMIN — Medication 19.6 MILLICURIE: at 09:23

## 2023-05-02 RX ADMIN — FUROSEMIDE 40 MG: 10 INJECTION, SOLUTION INTRAMUSCULAR; INTRAVENOUS at 10:57

## 2023-05-02 RX ADMIN — SODIUM CHLORIDE, PRESERVATIVE FREE 10 ML: 5 INJECTION INTRAVENOUS at 20:12

## 2023-05-02 RX ADMIN — SOTALOL HYDROCHLORIDE 80 MG: 80 TABLET ORAL at 10:58

## 2023-05-02 RX ADMIN — APIXABAN 5 MG: 5 TABLET, FILM COATED ORAL at 20:11

## 2023-05-02 RX ADMIN — FUROSEMIDE 40 MG: 10 INJECTION, SOLUTION INTRAMUSCULAR; INTRAVENOUS at 20:12

## 2023-05-02 ASSESSMENT — PAIN SCALES - GENERAL
PAINLEVEL_OUTOF10: 0

## 2023-05-02 NOTE — PLAN OF CARE
Pt has relatively uneventful night. Pt up independently to bathroom throughout shift. Pt alert and oriented x4, currently on room air. Pt denies shortness of breath. Currently on IV lasix for +4 bilateral leg edema. Great output overnight. All needs currently met.    Problem: Discharge Planning  Goal: Discharge to home or other facility with appropriate resources  5/2/2023 0312 by Lacey Collado RN  Outcome: Progressing     Problem: Safety - Adult  Goal: Free from fall injury  5/2/2023 0312 by Lacey Collado RN  Outcome: Progressing     Problem: ABCDS Injury Assessment  Goal: Absence of physical injury  5/2/2023 4158 by Lacey Collado RN  Outcome: Progressing     Problem: Cardiovascular - Adult  Goal: Maintains optimal cardiac output and hemodynamic stability  5/2/2023 0312 by Lacey Collado RN  Outcome: Progressing     Problem: Metabolic/Fluid and Electrolytes - Adult  Goal: Electrolytes maintained within normal limits  5/2/2023 0312 by Lacey Collado RN  Outcome: Progressing

## 2023-05-02 NOTE — FLOWSHEET NOTE
Patient completed lexiscan stress test,vitals stable and charted. Patient denied any chest discomfort.

## 2023-05-02 NOTE — PROGRESS NOTES
[facility]  Office: [address], DO, Tate Montgomeryum, DO, Amos Elise, DO, [facility] Blood, DO, Antoni Velasquez MD, Kerwin Farnsworth MD, Noelle Lorenzo MD, Juanito Martin MD,  Erick Zacarias MD, Allison Snellen, MD, Jose Donovan, DO, Kiersten Han MD,  Mariela English MD, Shari Ponce MD, Doug Alan DO, Miki Luna MD, Valerie Collado MD, Artis Max, DO, Jyothi Jack MD, Steve Cole MD, Tori Aranda MD, Lakshmi Delgado MD,  Chaparrita Anders, DO, Joi Gauthier MD,  Ronnie Barksdale, CNP,  Connor Mejía, CNP, Amalia Jurado, CNP, Jeronimo Aguila, CNP,  Sanjay Becerra, [facility], Chance Cruz, CNP, Lizeth Mooney, CNP, Batsheva Delgadillo, CNP, Jose J Cardoso, CNP, Morena Browne, CNP, FELIX Harmon-MARY, Farooq Carl, CNS, Simon Ingram, CNP, Carroll Diehl, CNP         [address]    Progress Note    5/2/2023    10:46 AM    Name:   Darcy Elkins  MRN:     [id number]     Acct:      [de-identified]   Room:   [address] [place] [address] Day:  1  Admit Date:  5/1/2023 10:33 AM    PCP:   Christina Ojeda DO  Code Status:  Full Code    Subjective:     She is feeling better, breathing is improved and she is able to ambulate a little better but still has swelling in her legs is present with swelling up to her thigh. No chest pain, cough, congestion    Brief History: This is a 11year-old female who presents to the hospital for evaluation of shortness of breath and bilateral lower extremity edema up to her thighs that started several days prior to arrival.  Patient noticed a 25 pound weight gain on admission and difficulty ambulating. Chest x-ray showed right-sided pleural effusion and no overt pulmonary edema. proBNP was elevated. Patient was admitted and treated with IV Lasix with net diuresis of: She was seen by cardiology for atrial fibrillation with RVR and was started on sotalol.   Cardiac stress test was done which was

## 2023-05-02 NOTE — PROCEDURES
[address] [facility]                 [address] [facility], [facility]                              CARDIAC STRESS TEST    PATIENT NAME: Violet Khoury                       :        1950  MED REC NO:   [id number]                             ROOM:       1004  ACCOUNT NO:   [de-identified]                           ADMIT DATE: 2023  PROVIDER:     Adria Carlin MD    DATE OF STUDY:  2023    LEXISCAN PHARMACOLOGIC STRESS TEST    ATTENDING PROVIDER:  Keny Kiser MD    PRIMARY CARE PROVIDER:  Georgia Headley DO    PERFORMING PHYSICIAN:  Adria Carlin MD    Indication:  Acute diastolic heart failure. The baseline blood pressure was 132/101 mmHg with a heart rate of 86. Nuha Mews was infused using the standard protocol. The patient tolerated the procedure well with no complaints. With Lexiscan infusion, there were no significant changes in blood  pressure. The baseline EKG demonstrated atrial fibrillation, poor R-wave  progression. Reversal of atrial leads, subsequently corrected. Lexiscan infusion did not demonstrate ST changes diagnostic of ischemia. No ventricular ectopy was noted during the study. EKG response is negative for ischemic changes. IMPRESSION:  EKG portion of pharmacologic stress test is negative for  ischemia. Nuclear portion reported separately.           Mike Parada MD    D: 2023 11:17:46       T: 2023 11:30:55     LEXI/[email or web address]  Job#: [id number]     Doc#: Unknown

## 2023-05-02 NOTE — CONSULTS
(Last 24 hours) at 5/2/2023 0831  Last data filed at 5/2/2023 0713  Gross per 24 hour   Intake 960 ml   Output 5700 ml   Net -4740 ml       GENERAL:  Alert, appropriate, oriented, in NAD. HEENT:  Head is atraumatic and normocephalic. No Pallor. No icterus. NECK: Supple without any thyromegaly. LUNGS: Generally decreased breath sounds. CARDIAC: S1, S2, regular rhythm. ABD:  Soft non-tender . EXT: ++edema. Chronic venous stasis. .  MS: No obvious deformities. SKIN: No obvious skin rashes. NEURO: No focal neurologic deficits    Labs/ Ancillary data:     CBC:   Recent Labs     05/01/23  1057 05/02/23  0508   WBC 8.4 7.5   HGB 16.0* 15.1    243     BMP:    Recent Labs     05/01/23  1057 05/02/23  0508    144   K 4.4 3.7    105   CO2 29 31   BUN 14 14   CREATININE 0.83 0.77   GLUCOSE 120* 94     Hepatic:   Recent Labs     05/01/23  1057   AST 32*   ALT 82*   BILITOT 0.8   ALKPHOS 138*         Imaging:    CXR: Small right-sided pleural effusion. Echo: Normal left ventricular ejection fraction. Moderate left atrial enlargement mild right atrial enlargement. EKG: Atrial fibrillation with rapid ventricular response. Impression :     Acute on chronic diastolic heart failure. Right-sided heart failure from diastolic heart failure. Atrial fibrillation with rapid ventricular response-rate better controlled. Chronic bronchitis. Tobacco abuse. Hyperlipidemia. Hypertension. Plan :   Reviewed previous cardiology records. Agree with IV diuresis. We will switch metoprolol to sotalol to see if patient can be converted back to sinus rhythm. We will get Lexiscan myocardial perfusion study to evaluate and rule out ischemia. Discussion with patient regarding tobacco cessation. Thank you very much for allowing us to participate in the care of this patient. Please call us with any questions.

## 2023-05-02 NOTE — PROGRESS NOTES
Occupational [address]  Occupational Therapy Not Seen Note    Patient not available for Occupational Therapy due to:    [] Testing:    [] Hemodialysis    [] Cancelled by RN:    [] Refusal by Patient:    [] Surgery:     [] Intubation:     [] Pain Medication:    [] Sedation:     [] Spine Precautions :    [] Medical Instability:    [x] Other: Defer OT eval at this time. Pt Independent with no skilled OT needs at this time. Pt ambulating around without difficulties. Will discharge from OT, please reorder if there is a functional status change.       Rena Menon, OT

## 2023-05-02 NOTE — ACP (ADVANCE CARE PLANNING)
Advance Care Planning     Advance Care Planning Activator (Inpatient)  Conversation Note      Date of ACP Conversation: 5/2/2023     Conversation Conducted with: Patient with Decision Making Capacity    ACP Activator: Wai Rowe RN        Health Care Decision Maker: self     Current Designated Health Care Decision Maker: self     Click here to complete Healthcare Decision Makers including section of the Healthcare Decision Maker Relationship (ie \"Primary\")  Today we documented Decision Maker(s). The patient will provide ACP documents. Care Preferences    Ventilation: \"If you were in your present state of health and suddenly became very ill and were unable to breathe on your own, what would your preference be about the use of a ventilator (breathing machine) if it were available to you? \"      Would the patient desire the use of ventilator (breathing machine)?: yes confirmed full code          [] Yes   [] No   Educated Patient / Decision Maker regarding differences between Advance Directives and portable DNR orders.     Length of ACP Conversation in minutes:  10    Conversation Outcomes:  ACP discussion completed    Follow-up plan:    [] Schedule follow-up conversation to continue planning  [x] Referred individual to Provider for additional questions/concerns   [] Advised patient/agent/surrogate to review completed ACP document and update if needed with changes in condition, patient preferences or care setting    [] This note routed to one or more involved healthcare providers

## 2023-05-02 NOTE — CARE COORDINATION
Case Management Assessment  Initial Evaluation    Date/Time of Evaluation: 5/2/2023 9:28 AM  Assessment Completed by: Yuli Gonzalez RN    If patient is discharged prior to next notation, then this note serves as note for discharge by case management. Patient Name: Molly Wade                   YOB: 1950  Diagnosis: Atrial fibrillation with RVR ([facility] [address] [I48.91]  Acute on chronic congestive heart failure, unspecified heart failure type ([facility] [address] [I50.9]  Decompensated heart failure ([facility] [address] [I50.9]                   Date / Time: 5/1/2023 10:33 AM    Patient Admission Status: Inpatient   Readmission Risk (Low < 19, Mod (19-27), High > 27): Readmission Risk Score: 7.1    Current PCP: Lizandro Wilkinson, DO  PCP verified by CM? Yes    Chart Reviewed: Yes      History Provided by: Patient  Patient Orientation: Alert and Oriented    Patient Cognition: Alert    Hospitalization in the last 30 days (Readmission):  No    If yes, Readmission Assessment in CM Navigator will be completed. Advance Directives:      Code Status: Full Code   Patient's Primary Decision Maker is:  (self)      Discharge Planning:    Patient lives with: Children, Family Members Type of Home: House  Primary Care Giver: Self  Patient Support Systems include: Family Members, Children   Current Financial resources: Medicare ([facility] Medicare secondary)  Current community resources:    Current services prior to admission: Durable Medical Equipment            Current DME: Home Aerosol, Wheelchair (has wc does not use)            Type of Home Care services:  None    ADLS  Prior functional level: Independent in ADLs/IADLs  Current functional level: Independent in ADLs/IADLs    PT AM-PAC:   /24  OT AM-PAC:   /24    Family can provide assistance at DC: Yes  Would you like Case Management to discuss the discharge plan with any other family members/significant others, and if so, who?     Plans to Return to Present Housing: Yes  Other Identified

## 2023-05-02 NOTE — PROGRESS NOTES
Physical Therapy  DATE: 2023    NAME: Leif Rodriguez  MRN: [id number]   : 1950    Patient not seen this date for Physical Therapy due to:      [] Cancel by RN or physician due to:    [] Hemodialysis    [] Critical Lab Value Level     [] Blood transfusion in progress    [] Acute or unstable cardiovascular status   _MAP < 55 or more than >115  _HR < 40 or > 130    [] Acute or unstable pulmonary status   -FiO2 > 60%   _RR < 5 or >40    _O2 sats < 85%    [] Strict Bedrest    [] Off Unit for surgery or procedure    [] Off Unit for testing       [] Pending imaging to R/O fracture    [] Refusal by Patient      [] Other      [x] PT being discontinued at this time. Patient independent. No further needs. Writer arrived to see pt for PT eval this AM.  Upon arrival, pt up walking around room w/o assist demonstrating good steadiness throughout. Pt denying any hx of falls or any current changes in balance, strength, mobility. Pt also reporting no SOB throughout mobility. Pt educated on purpose of acute PT & importance of continued mobility throughout admission w/ good understanding verbalized. Pt denying any questions/concerns upon writer's exit. PT will defer evaluation at this time. Please re-order skilled PT if functional mobility status changes. [] PT being discontinued at this time as the patient has been transferred to hospice care. No further needs.       Gil Foster, PT

## 2023-05-02 NOTE — PLAN OF CARE
-Pt has had an uneventful shift. Dr. Dena Cook has adjusted cardiac medications. Pt remains in Afib and is rate controlled on telemetry. Peripheral edema and dyspnea has improved. VSS. IV diuretics continued and patient diuresing well. AM labs ordered. Patient compliant with 1500 ml FR. DW continued. IS encouraged and pt tolerates well.   -Pt up at riki. Fall risk assessment completed. Pt is at low risk for falls. Bed locked and in lowest position, side rails up 2/4, call light and bedside table within reach, clutter removed, and non-skid footwear on when pt is out of bed.   -Pt encouraged to quit smoking. Tobacco cessation education offered. Pt declines at this time.      Problem: Discharge Planning  Goal: Discharge to home or other facility with appropriate resources  Outcome: Progressing  Flowsheets (Taken 5/2/2023 0830)  Discharge to home or other facility with appropriate resources:   Identify barriers to discharge with patient and caregiver   Arrange for needed discharge resources and transportation as appropriate   Identify discharge learning needs (meds, wound care, etc)     Problem: Safety - Adult  Goal: Free from fall injury  Outcome: Progressing     Problem: ABCDS Injury Assessment  Goal: Absence of physical injury  Outcome: Progressing     Problem: Cardiovascular - Adult  Goal: Maintains optimal cardiac output and hemodynamic stability  Outcome: Progressing  Flowsheets (Taken 5/2/2023 0830)  Maintains optimal cardiac output and hemodynamic stability:   Monitor blood pressure and heart rate   Monitor urine output and notify Licensed Independent Practitioner for values outside of normal range   Assess for signs of decreased cardiac output   Administer fluid and/or volume expanders as ordered   Administer vasoactive medications as ordered     Problem: Cardiovascular - Adult  Goal: Absence of cardiac dysrhythmias or at baseline  Outcome: Progressing  Flowsheets (Taken 5/2/2023 0830)  Absence of cardiac dysrhythmias

## 2023-05-03 LAB
ALBUMIN SERPL-MCNC: 3.3 G/DL (ref 3.5–5.2)
ALP SERPL-CCNC: 110 U/L (ref 35–104)
ALT SERPL-CCNC: 50 U/L (ref 5–33)
ANION GAP SERPL CALCULATED.3IONS-SCNC: 8 MMOL/L (ref 9–17)
AST SERPL-CCNC: 18 U/L
BILIRUB DIRECT SERPL-MCNC: 0.3 MG/DL
BILIRUB INDIRECT SERPL-MCNC: 0.5 MG/DL (ref 0–1)
BILIRUB SERPL-MCNC: 0.8 MG/DL (ref 0.3–1.2)
BUN SERPL-MCNC: 14 MG/DL (ref 8–23)
BUN/CREAT BLD: 18 (ref 9–20)
CALCIUM SERPL-MCNC: 8.8 MG/DL (ref 8.6–10.4)
CHLORIDE SERPL-SCNC: 103 MMOL/L (ref 98–107)
CO2 SERPL-SCNC: 30 MMOL/L (ref 20–31)
CREAT SERPL-MCNC: 0.76 MG/DL (ref 0.5–0.9)
GFR SERPL CREATININE-BSD FRML MDRD: >60 ML/MIN/1.73M2
GLUCOSE SERPL-MCNC: 111 MG/DL (ref 70–99)
MAGNESIUM SERPL-MCNC: 2 MG/DL (ref 1.6–2.6)
POTASSIUM SERPL-SCNC: 3.8 MMOL/L (ref 3.7–5.3)
PROT SERPL-MCNC: 6.1 G/DL (ref 6.4–8.3)
REASON FOR REJECTION: NORMAL
SODIUM SERPL-SCNC: 141 MMOL/L (ref 135–144)
ZZ NTE CLEAN UP: ORDERED TEST: NORMAL
ZZ NTE WITH NAME CLEAN UP: SPECIMEN SOURCE: NORMAL

## 2023-05-03 PROCEDURE — 6360000002 HC RX W HCPCS: Performed by: INTERNAL MEDICINE

## 2023-05-03 PROCEDURE — 99232 SBSQ HOSP IP/OBS MODERATE 35: CPT | Performed by: INTERNAL MEDICINE

## 2023-05-03 PROCEDURE — 80076 HEPATIC FUNCTION PANEL: CPT

## 2023-05-03 PROCEDURE — 83735 ASSAY OF MAGNESIUM: CPT

## 2023-05-03 PROCEDURE — 6370000000 HC RX 637 (ALT 250 FOR IP): Performed by: INTERNAL MEDICINE

## 2023-05-03 PROCEDURE — 6370000000 HC RX 637 (ALT 250 FOR IP): Performed by: NURSE PRACTITIONER

## 2023-05-03 PROCEDURE — 36415 COLL VENOUS BLD VENIPUNCTURE: CPT

## 2023-05-03 PROCEDURE — 2580000003 HC RX 258: Performed by: INTERNAL MEDICINE

## 2023-05-03 PROCEDURE — 80048 BASIC METABOLIC PNL TOTAL CA: CPT

## 2023-05-03 PROCEDURE — 2060000000 HC ICU INTERMEDIATE R&B

## 2023-05-03 RX ORDER — FUROSEMIDE 40 MG/1
40 TABLET ORAL DAILY
Status: DISCONTINUED | OUTPATIENT
Start: 2023-05-03 | End: 2023-05-03

## 2023-05-03 RX ORDER — BUMETANIDE 1 MG/1
1 TABLET ORAL DAILY
Status: DISCONTINUED | OUTPATIENT
Start: 2023-05-03 | End: 2023-05-04 | Stop reason: HOSPADM

## 2023-05-03 RX ORDER — SPIRONOLACTONE 25 MG/1
25 TABLET ORAL DAILY
Status: DISCONTINUED | OUTPATIENT
Start: 2023-05-03 | End: 2023-05-04 | Stop reason: HOSPADM

## 2023-05-03 RX ORDER — POTASSIUM CHLORIDE 20 MEQ/1
40 TABLET, EXTENDED RELEASE ORAL ONCE
Status: COMPLETED | OUTPATIENT
Start: 2023-05-03 | End: 2023-05-03

## 2023-05-03 RX ADMIN — APIXABAN 5 MG: 5 TABLET, FILM COATED ORAL at 20:19

## 2023-05-03 RX ADMIN — POTASSIUM CHLORIDE 40 MEQ: 1500 TABLET, EXTENDED RELEASE ORAL at 06:47

## 2023-05-03 RX ADMIN — SODIUM CHLORIDE, PRESERVATIVE FREE 10 ML: 5 INJECTION INTRAVENOUS at 08:45

## 2023-05-03 RX ADMIN — BUMETANIDE 1 MG: 1 TABLET ORAL at 20:19

## 2023-05-03 RX ADMIN — SOTALOL HYDROCHLORIDE 80 MG: 80 TABLET ORAL at 08:44

## 2023-05-03 RX ADMIN — SODIUM CHLORIDE, PRESERVATIVE FREE 10 ML: 5 INJECTION INTRAVENOUS at 20:19

## 2023-05-03 RX ADMIN — FUROSEMIDE 40 MG: 40 TABLET ORAL at 14:02

## 2023-05-03 RX ADMIN — Medication 120 MG: at 20:19

## 2023-05-03 RX ADMIN — ATORVASTATIN CALCIUM 20 MG: 20 TABLET, FILM COATED ORAL at 20:19

## 2023-05-03 RX ADMIN — APIXABAN 5 MG: 5 TABLET, FILM COATED ORAL at 08:44

## 2023-05-03 RX ADMIN — SPIRONOLACTONE 25 MG: 25 TABLET ORAL at 15:29

## 2023-05-03 RX ADMIN — EMPAGLIFLOZIN 10 MG: 10 TABLET, FILM COATED ORAL at 14:02

## 2023-05-03 RX ADMIN — FUROSEMIDE 40 MG: 10 INJECTION, SOLUTION INTRAMUSCULAR; INTRAVENOUS at 08:44

## 2023-05-03 NOTE — PROGRESS NOTES
Transitions of Care Pharmacy Service   Medication Review    The patient's list of current home medications has been reviewed. Source(s) of information: patient, Care Everywhere, Surescripts refill report    Medications that need to be addressed by a physician/nurse practitioner: none      Please feel free to call me with any questions about this encounter. Thank you.     Jennings Kayser, [facility]   Transitions of Care Pharmacy Service  Phone:  [phone]  Fax: [phone]      Electronically signed by Jennings Kayser, [facility] on 5/3/2023 at 10:04 AM           Medications Prior to Admission:   albuterol (ACCUNEB) 1.25 MG/3ML nebulizer solution, Inhale 3 mLs into the lungs every 6 hours as needed for Wheezing  tolnaftate (TINACTIN) 1 % cream, Apply topically daily as needed (under breast)  metoprolol (LOPRESSOR) 100 MG tablet, Take 1 tablet by mouth 2 times daily  apixaban (ELIQUIS) 5 MG TABS tablet, Take by mouth 2 times daily  atorvastatin (LIPITOR) 20 MG tablet, Take 1 tablet by mouth daily  albuterol sulfate HFA (VENTOLIN HFA) 108 (90 Base) MCG/ACT inhaler, Inhale 2 puffs into the lungs every 6 hours as needed for Wheezing  Multiple Vitamins-Minerals (CENTRUM SILVER 50+WOMEN PO), Take 1 tablet by mouth daily  acetaminophen (TYLENOL) 500 MG tablet, Take 1 tablet by mouth every 6 hours as needed for Pain

## 2023-05-03 NOTE — PROGRESS NOTES
[facility]  Office: [address], DO, Tracy Crook, DO, Татьяна Dueñas, DO, Rhonda Gutierrez Blood, DO, Leonor Contreras MD, Virgie Evans MD, Carlos Yun MD, Autry Collet, MD,  Janet Evangelista MD, Antonina Caldera MD, Andreas Sloan, DO, Tobias Stevenson MD,  Dustin Canada MD, Viviane Dickens MD, Trung Kelley DO, Kvng Vázquez MD, Pilar Vaz MD, Lynn Mckeon, DO, Marcin Vaca MD, Ko Johnson MD, Jamie Mora MD, Stefan Sharma MD,  Mariah Hicks DO, Tamanna Kate MD,  Kathleen Correa CNP,  Jared Paul CNP, Erick Melchor CNP, Doc Bella, CNP,  Yani Vilchis, [facility], Tracie Mcdaniel, CNP, Jacky Nation, CNP, Maricruz Hwang, CNP, Yong Deleon, CNP, General George, CNP, Sonya Toyn PA-C, Jesus Mckeon, CNS, Horton Blizzard, CNP, Jamie Farmer, [facility]         [address]    Progress Note    5/3/2023    10:55 AM    Name:   Wendie Wilson  MRN:     [id number]     Acct:      [de-identified]   Room:   [address] [place] [address] Day:  2  Admit Date:  5/1/2023 10:33 AM    PCP:   Geraldine Gutierrez DO  Code Status:  Full Code    Subjective:     Patient says she feels much better today and continues to improve slowly. She did have 7.6 L of urinary output overnight and is -12.6 L since admission. She says her abdominal distention is essentially resolved but still has some lower extremity edema. She says she is able to ambulate without any significant dyspnea. She has no chest pain, nausea, vomiting, diarrhea. Brief History: This is a 44-year-old female who presents to the hospital for evaluation of shortness of breath and bilateral lower extremity edema up to her thighs that started several days prior to arrival.  Patient noticed a 25 pound weight gain on admission and difficulty ambulating. Chest x-ray showed right-sided pleural effusion and no overt pulmonary edema. proBNP was elevated.   Patient was admitted

## 2023-05-03 NOTE — PLAN OF CARE
Pt still in a-fib, with a HR from . Sotalol increased for tonight from 80mg to 120mg, and added aldactone as well. Lasix decreased to 40mg PO from IV. Ambulating well in room, and urinating in hat for accurate I&Os. Problem: Cardiovascular - Adult  Goal: Absence of cardiac dysrhythmias or at baseline  5/3/2023 1552 by Elias Bermudez RN  Outcome: Progressing  Pt still in chronic a-fib, with blood pressure stable. Medications given, and vitals monitored.

## 2023-05-03 NOTE — PLAN OF CARE
Pt had relatively uneventful night. Currently Afib on monitor with heart rate mid 80s. Currently on Eliquis. Pt switched from metoprolol to sotalol 80mg twice daily yesterday. Pt on IV lasix, edema improving. 2L output overnight. All needs met at this time.    Problem: Discharge Planning  Goal: Discharge to home or other facility with appropriate resources  5/3/2023 0247 by Lizbeth Portillo RN  Outcome: Progressing     Problem: Safety - Adult  Goal: Free from fall injury  5/3/2023 0247 by Lizbeth Portillo RN  Outcome: Progressing     Problem: ABCDS Injury Assessment  Goal: Absence of physical injury  5/3/2023 0247 by Lizbeth Portillo RN  Outcome: Progressing     Problem: Cardiovascular - Adult  Goal: Maintains optimal cardiac output and hemodynamic stability  5/3/2023 0247 by Lizbeth Portillo RN  Outcome: Progressing     Problem: Cardiovascular - Adult  Goal: Absence of cardiac dysrhythmias or at baseline  5/3/2023 0247 by Lizbeth Portillo RN  Outcome: Progressing     Problem: Metabolic/Fluid and Electrolytes - Adult  Goal: Electrolytes maintained within normal limits  5/3/2023 0247 by Lizbeth Portillo RN  Outcome: Progressing     Problem: Metabolic/Fluid and Electrolytes - Adult  Goal: Hemodynamic stability and optimal renal function maintained  5/3/2023 0247 by Lizbeth Portillo RN  Outcome: Progressing

## 2023-05-03 NOTE — PROGRESS NOTES
Cardiovascular progress Note          Patient name: Sudha Gutierrez    YOB: 1950  Date of admission:  5/1/2023       Patient seen, examined. Previous clinical entries reviewed. All available laboratory, imaging and ancillary data reviewed. Subjective:      She is feeling better. Her lower extremity edema has improved. Excellent diuresis. Heart rate appears to be reasonably controlled. Systems review:  Constitutional: No fever/chills. HENT: No headache, neck pain or neck stiffness. No sore throat or dysphagia. Gastrointestinal: No abdominal pain, nausea or vomiting. Cardiac: As Above  Respiratory: As above  Neurologic: No new focal weakness or numbness  Psychiatric: Normal mood and mentation       Examination:   Vitals: BP (!) 129/93   Pulse 98   Temp 98.1 °F (36.7 °C) (Oral)   Resp 16   Ht 5' 7\" (1.702 m)   Wt 254 lb 11.2 oz (115.5 kg)   SpO2 93%   BMI 39.89 kg/m²     Intake/Output Summary (Last 24 hours) at 5/3/2023 1421  Last data filed at 5/3/2023 1246  Gross per 24 hour   Intake 480 ml   Output 7300 ml   Net -6820 ml       General appearance: Comfortable in no apparent distress. HEENT: No pallor. No icterus  Neck: Supple. Lungs:Generally decreased breath sounds  Heart: S1,S2, Irregular rhythm  Abdomen: Soft  Extremities: + peripheral edema  Skin: No obvious rashes. Musculoskeletal: No obvious deformities. Neurologic: No focal deficits.      Labs/ Ancillary data:     CBC:   Recent Labs     05/01/23  1057 05/02/23  0508   WBC 8.4 7.5   HGB 16.0* 15.1    243     BMP:    Recent Labs     05/01/23  1057 05/02/23  0508 05/03/23  0559    144 141   K 4.4 3.7 3.8    105 103   CO2 29 31 30   BUN 14 14 14   CREATININE 0.83 0.77 0.76   GLUCOSE 120* 94 111*     Hepatic:   Recent Labs     05/01/23  1057 05/03/23  0559   AST 32* 18   ALT 82* 50*   BILITOT 0.8 0.8   ALKPHOS 138* 110*       Medications:   Scheduled Meds:   furosemide  40 mg Oral Daily    empagliflozin  10

## 2023-05-04 VITALS
HEART RATE: 98 BPM | SYSTOLIC BLOOD PRESSURE: 134 MMHG | WEIGHT: 254.7 LBS | BODY MASS INDEX: 39.98 KG/M2 | DIASTOLIC BLOOD PRESSURE: 98 MMHG | OXYGEN SATURATION: 93 % | HEIGHT: 67 IN | RESPIRATION RATE: 18 BRPM | TEMPERATURE: 97.7 F

## 2023-05-04 LAB
ALBUMIN SERPL-MCNC: 3.4 G/DL (ref 3.5–5.2)
ALP SERPL-CCNC: 102 U/L (ref 35–104)
ALT SERPL-CCNC: 40 U/L (ref 5–33)
ANION GAP SERPL CALCULATED.3IONS-SCNC: 8 MMOL/L (ref 9–17)
AST SERPL-CCNC: 16 U/L
BILIRUB DIRECT SERPL-MCNC: 0.3 MG/DL
BILIRUB INDIRECT SERPL-MCNC: 0.4 MG/DL (ref 0–1)
BILIRUB SERPL-MCNC: 0.7 MG/DL (ref 0.3–1.2)
BUN SERPL-MCNC: 13 MG/DL (ref 8–23)
BUN/CREAT BLD: 16 (ref 9–20)
CALCIUM SERPL-MCNC: 9 MG/DL (ref 8.6–10.4)
CHLORIDE SERPL-SCNC: 103 MMOL/L (ref 98–107)
CO2 SERPL-SCNC: 31 MMOL/L (ref 20–31)
CREAT SERPL-MCNC: 0.8 MG/DL (ref 0.5–0.9)
GFR SERPL CREATININE-BSD FRML MDRD: >60 ML/MIN/1.73M2
GLUCOSE SERPL-MCNC: 110 MG/DL (ref 70–99)
MAGNESIUM SERPL-MCNC: 2.1 MG/DL (ref 1.6–2.6)
POTASSIUM SERPL-SCNC: 3.9 MMOL/L (ref 3.7–5.3)
PROT SERPL-MCNC: 6.2 G/DL (ref 6.4–8.3)
SODIUM SERPL-SCNC: 142 MMOL/L (ref 135–144)

## 2023-05-04 PROCEDURE — 6370000000 HC RX 637 (ALT 250 FOR IP): Performed by: STUDENT IN AN ORGANIZED HEALTH CARE EDUCATION/TRAINING PROGRAM

## 2023-05-04 PROCEDURE — 80076 HEPATIC FUNCTION PANEL: CPT

## 2023-05-04 PROCEDURE — 36415 COLL VENOUS BLD VENIPUNCTURE: CPT

## 2023-05-04 PROCEDURE — 6370000000 HC RX 637 (ALT 250 FOR IP): Performed by: INTERNAL MEDICINE

## 2023-05-04 PROCEDURE — 2580000003 HC RX 258: Performed by: INTERNAL MEDICINE

## 2023-05-04 PROCEDURE — 80048 BASIC METABOLIC PNL TOTAL CA: CPT

## 2023-05-04 PROCEDURE — 83735 ASSAY OF MAGNESIUM: CPT

## 2023-05-04 PROCEDURE — 99232 SBSQ HOSP IP/OBS MODERATE 35: CPT | Performed by: STUDENT IN AN ORGANIZED HEALTH CARE EDUCATION/TRAINING PROGRAM

## 2023-05-04 RX ORDER — SOTALOL HYDROCHLORIDE 120 MG/1
120 TABLET ORAL 2 TIMES DAILY
Qty: 60 TABLET | Refills: 3 | Status: SHIPPED | OUTPATIENT
Start: 2023-05-04

## 2023-05-04 RX ORDER — SPIRONOLACTONE 25 MG/1
25 TABLET ORAL DAILY
Qty: 30 TABLET | Refills: 3 | Status: SHIPPED | OUTPATIENT
Start: 2023-05-05

## 2023-05-04 RX ORDER — POTASSIUM CHLORIDE 20 MEQ/1
20 TABLET, EXTENDED RELEASE ORAL ONCE
Status: COMPLETED | OUTPATIENT
Start: 2023-05-04 | End: 2023-05-04

## 2023-05-04 RX ORDER — BUMETANIDE 1 MG/1
1 TABLET ORAL DAILY
Qty: 30 TABLET | Refills: 3 | Status: SHIPPED | OUTPATIENT
Start: 2023-05-05

## 2023-05-04 RX ADMIN — BUMETANIDE 1 MG: 1 TABLET ORAL at 08:39

## 2023-05-04 RX ADMIN — APIXABAN 5 MG: 5 TABLET, FILM COATED ORAL at 08:39

## 2023-05-04 RX ADMIN — ACETAMINOPHEN 650 MG: 325 TABLET ORAL at 10:47

## 2023-05-04 RX ADMIN — SODIUM CHLORIDE, PRESERVATIVE FREE 10 ML: 5 INJECTION INTRAVENOUS at 08:46

## 2023-05-04 RX ADMIN — Medication 120 MG: at 08:39

## 2023-05-04 RX ADMIN — SPIRONOLACTONE 25 MG: 25 TABLET ORAL at 08:39

## 2023-05-04 RX ADMIN — EMPAGLIFLOZIN 10 MG: 10 TABLET, FILM COATED ORAL at 08:40

## 2023-05-04 RX ADMIN — POTASSIUM CHLORIDE 20 MEQ: 1500 TABLET, EXTENDED RELEASE ORAL at 08:40

## 2023-05-04 ASSESSMENT — PAIN SCALES - GENERAL: PAINLEVEL_OUTOF10: 3

## 2023-05-04 ASSESSMENT — PAIN DESCRIPTION - LOCATION: LOCATION: LEG

## 2023-05-04 NOTE — PROGRESS NOTES
Patient's heart rate remains steady afib, ranging from mid-90s to 110, occasioning rising to 120s when up voiding & ambulating in hallway, then returns to baseline range. Patient denies any chest pain or palpitations throughout the evening. Patient requires list of outpatient sleep study facilities before discharge.

## 2023-05-04 NOTE — PLAN OF CARE
Pt resting comfortably in bed. She denies pain and discomfort. Pt is ready for discharge.  Waiting for cardiology's clearance   Problem: Discharge Planning  Goal: Discharge to home or other facility with appropriate resources  Outcome: Progressing     Problem: Safety - Adult  Goal: Free from fall injury  Outcome: Progressing     Problem: Cardiovascular - Adult  Goal: Maintains optimal cardiac output and hemodynamic stability  Outcome: Progressing     Problem: Cardiovascular - Adult  Goal: Absence of cardiac dysrhythmias or at baseline  Outcome: Progressing

## 2023-05-04 NOTE — DISCHARGE INSTR - COC
Continuity of Care Form    Patient Name: Jason Guy   :  1950  MRN:  [id number]    Admit date:  2023  Discharge date:  ***    Code Status Order: Full Code   Advance Directives:     Admitting Physician:  Moon Nieves DO  PCP: Chano Macias DO    Discharging Nurse: [facility] Unit/Room#: 5602/5357-13  Discharging Unit Phone Number: ***    Emergency Contact:   Extended Emergency Contact Information  Primary Emergency Contact: [facility]  Address: [address] [place] [address] [facility] [address] Phone: [phone]  Work Phone: [phone]  Mobile Phone: [phone]  Relation: Child  Secondary Emergency Contact: rupal ruelas  Mobile Phone: [phone]  Relation: Brother/Sister    Past Surgical History:  Past Surgical History:   Procedure Laterality Date    HERNIA REPAIR      HYSTERECTOMY (CERVIX STATUS UNKNOWN)         Immunization History:   Immunization History   Administered Date(s) Administered    COVID-19, MODERNA BLUE border, Primary or Immunocompromised, (age 12y+), IM, 100 mcg/0.5mL 2021    COVID-19, PFIZER PURPLE top, DILUTE for use, (age 15 y+), 30mcg/0.3mL 2021       Active Problems:  Patient Active Problem List   Diagnosis Code    Acute diastolic heart failure (HCC) I50.31    Atrial fibrillation with RVR (HCC) I48.91    Chronic bronchitis (HCC) J42    Class 3 severe obesity due to excess calories with serious comorbidity and body mass index (BMI) of 40.0 to 44.9 in adult ([facility] [address] E66.01, Z68.41    Mixed hyperlipidemia E78.2    Tobacco abuse Z72.0    Primary hypertension I10    High hematocrit R71.8       Isolation/Infection:   Isolation            No Isolation          Patient Infection Status       None to display            Nurse Assessment:  Last Vital Signs: BP (!) 134/98   Pulse 98   Temp 97.7 °F (36.5 °C)   Resp 18   Ht 5' 7\" (1.702 m)   Wt 254 lb 11.2 oz (115.5 kg)   SpO2 93%   BMI 39.89 kg/m²     Last documented pain

## 2023-05-04 NOTE — DISCHARGE INSTRUCTIONS
Follow-up with your PCP, cardiology and pulmonology as instructed. Will need outpatient sleep study with pulmonologist.  Medications as instructed. Return to the emergency department medially for any new or worsening concerns.

## 2023-05-04 NOTE — PROGRESS NOTES
CLINICAL PHARMACY NOTE: MEDS TO BEDS    Total # of Prescriptions Filled: 4   The following medications were delivered to the patient:  Bumetanide 1mg  Sotalol 120mg  Jardiance 10mg  Spironolactone 25mg    Additional Documentation:

## 2023-05-04 NOTE — PROGRESS NOTES
[facility]  Office: [address], DO, Azucena Ayesha, DO, Belen Kathie, DO, Que Leong Leonarda, DO, Jignesh Hill MD, Bianca Oswald MD, Kingsley Luther MD, Kerwin Wilcox MD,  Branden Juarez MD, Adolfo Reyes MD, Richard Bush, DO, Anna Mooney MD,  Stan Simon MD, Be Gr MD, Chance Crowley DO, Whit Burt MD, Martina Alva MD, Ruchi Ward DO, Chauncey Bullard MD, Marce Calderon MD, Milana Good MD, Scott Camejo MD,  Michele Alvarado DO, Marina Raymundo MD,  Vinny Bell, CNP,  Brittaney Monroy, CNP, Ambika Almanzar, CNP, Karla Mccabe, CNP,  Kai Raymond, [facility], Les Thurman, CNP, Hilario March, CNP, Krunal Fallon, CNP, Ana Tovar, CNP, Ashkan Birch, CNP, Nell Jorge PA-C, Javier Jacobs, [facility], Kymberly Orellana, CNP, Madeline Candelario, CNP         [facility]    Progress Note    5/4/2023    7:24 AM    Name:   Jeff Rollins  MRN:     [id number]     Acct:      [de-identified]   Room:   [address] [place] [address] Day:  3  Admit Date:  5/1/2023 10:33 AM    PCP:   Tali Fong DO  Code Status:  Full Code    Subjective:     C/C:   Chief Complaint   Patient presents with    Shortness of Breath     States smokes a pack of cig a day    Atrial Fibrillation     Diagnosed  March 17, taking lopressor and eliquis    Swelling     Legs/hips     Interval History Status: improved. Vitals reviewed, febrile hemodynamic stable. Heart rate 87 this morning. Saturating on room air. Labs reviewed, potassium 3.9  replaced with 20 mEq  Overnight patient had no significant events. On examination patient resting comfortably in chair at bedside. States he feels significantly improved and respiratory status saturating well on room air currently. Awaiting cardiology clearance for discharge. Brief History:      This is a 79-year-old female who presents to the hospital for evaluation of shortness of breath and

## 2023-05-04 NOTE — PROGRESS NOTES
Pt discharged to home, left via private vehicle. Belonging gathered and taken with pt, IV removed, discharge instruction given, pt verbalizes understanding. All questions and concerns addressed. Safety maintained.

## 2023-05-04 NOTE — PROGRESS NOTES
Cardiovascular progress Note          Patient name: Mavis Guerra    YOB: 1950  Date of admission:  5/1/2023       Patient seen, examined. Previous clinical entries reviewed. All available laboratory, imaging and ancillary data reviewed. Subjective:      She is feeling much better. Her lower extremity edema has improved. Heart rate appears to be better controlled. Systems review:  Constitutional: No fever/chills. HENT: No headache, neck pain or neck stiffness. No sore throat or dysphagia. Gastrointestinal: No abdominal pain, nausea or vomiting. Cardiac: As Above  Respiratory: As above  Neurologic: No new focal weakness or numbness  Psychiatric: Normal mood and mentation       Examination:   Vitals: BP (!) 134/98   Pulse 98   Temp 97.7 °F (36.5 °C)   Resp 18   Ht 5' 7\" (1.702 m)   Wt 254 lb 11.2 oz (115.5 kg)   SpO2 93%   BMI 39.89 kg/m²     Intake/Output Summary (Last 24 hours) at 5/4/2023 1630  Last data filed at 5/4/2023 1236  Gross per 24 hour   Intake 240 ml   Output 2900 ml   Net -2660 ml         General appearance: Comfortable in no apparent distress. HEENT: No pallor. No icterus  Neck: Supple. Lungs:Generally decreased breath sounds  Heart: S1,S2, Irregular rhythm  Abdomen: Soft  Extremities: + peripheral edema  Skin: No obvious rashes. Musculoskeletal: No obvious deformities. Neurologic: No focal deficits.      Labs/ Ancillary data:     CBC:   Recent Labs     05/02/23  0508   WBC 7.5   HGB 15.1          BMP:    Recent Labs     05/02/23  0508 05/03/23  0559 05/04/23  0528    141 142   K 3.7 3.8 3.9    103 103   CO2 31 30 31   BUN 14 14 13   CREATININE 0.77 0.76 0.80   GLUCOSE 94 111* 110*       Hepatic:   Recent Labs     05/03/23  0559 05/04/23  0528   AST 18 16   ALT 50* 40*   BILITOT 0.8 0.7   ALKPHOS 110* 102         Medications:   Scheduled Meds:   empagliflozin  10 mg Oral Daily    spironolactone  25 mg Oral Daily    sotalol  120 mg Oral BID

## 2023-05-08 ENCOUNTER — HOSPITAL ENCOUNTER (OUTPATIENT)
Age: 73
Setting detail: SPECIMEN
Discharge: HOME | End: 2023-05-08

## 2023-05-08 DIAGNOSIS — I50.9 ACUTE ON CHRONIC CONGESTIVE HEART FAILURE, UNSPECIFIED HEART FAILURE TYPE (HCC): ICD-10-CM

## 2023-05-08 LAB
ANION GAP SERPL CALCULATED.3IONS-SCNC: 22 MMOL/L (ref 9–17)
BUN SERPL-MCNC: 20 MG/DL (ref 8–23)
CALCIUM SERPL-MCNC: 9.7 MG/DL (ref 8.6–10.4)
CHLORIDE SERPL-SCNC: 103 MMOL/L (ref 98–107)
CO2 SERPL-SCNC: 20 MMOL/L (ref 20–31)
CREAT SERPL-MCNC: 1.15 MG/DL (ref 0.5–0.9)
GFR SERPL CREATININE-BSD FRML MDRD: 51 ML/MIN/1.73M2
GLUCOSE SERPL-MCNC: 148 MG/DL (ref 70–99)
POTASSIUM SERPL-SCNC: 4.5 MMOL/L (ref 3.7–5.3)
SODIUM SERPL-SCNC: 145 MMOL/L (ref 135–144)

## 2023-05-16 NOTE — DISCHARGE SUMMARY
[facility]  Office: [address], DO, Marcos Rodriguez, DO, Alonso Cuellar, DO, Cleofarhana Cannonjennie Brower, DO, Jose E Barney MD, Bernardo Grant MD, Rita Rosen MD, Zac Gomez MD,  Froilan Mtz MD, Shawanda Wilson MD, Abi Downing DO, Paige Song MD,  Amos Leung MD, Hattie De La O MD, Osiris Lai DO, Abby Rincon MD, Aron Nielsen MD, Marilyn Berrios DO, Sara Stone MD, Asael Johnson MD, Keisha Lee MD, Hermelinda Osman MD,  Prerna Mai DO, Mehnaz Arredondo MD,  Piper Holley, CNP,  Thea Hinson, CNP, Severiano Baptist, [facility], Mia Garcia, CNP,  Reji Dugan, [facility], Va Kessler, CNP, Marva Weaver, CNP, Yajaira Eller, CNP, Torie Gaitan, CNP, Renee Urban, [facility], Kaern Harris PA-C, Alissa Romero, CNS, Sharon Galeas, CNP, Ethan Mercado, [facility]    Discharge Summary     Patient ID: Rylie Garcia  :  1950   MRN: [id number]     ACCOUNT:  [de-identified]   Patient's PCP: Emanuel Cameron DO  Admit Date: 2023   Discharge Date: 2023   Length of Stay: 3  Code Status:  Prior  Admitting Physician: Kavita Carpio DO  Discharge Physician: Marilyn Berrios DO     Active Discharge Diagnoses:     Hospital Problem Lists:  Principal Problem:    Acute diastolic heart failure [facility])  Active Problems:    Atrial fibrillation with RVR ([facility] [address]    Chronic bronchitis (HCC)    Class 3 severe obesity due to excess calories with serious comorbidity and body mass index (BMI) of 40.0 to 44.9 in adult [facility])    Mixed hyperlipidemia    Tobacco abuse    Primary hypertension    High hematocrit  Resolved Problems:    * No resolved hospital problems. *      Admission Condition:  fair     Discharged Condition: good    Hospital Stay:     Hospital Course:       This is a 77-year-old female who presents to the hospital for evaluation of shortness of breath and bilateral lower extremity edema up

## 2024-03-03 ENCOUNTER — HOSPITAL ENCOUNTER (EMERGENCY)
Age: 74
Discharge: HOME | End: 2024-03-03
Attending: EMERGENCY MEDICINE
Payer: MEDICARE

## 2024-03-03 ENCOUNTER — APPOINTMENT (OUTPATIENT)
Dept: GENERAL RADIOLOGY | Age: 74
End: 2024-03-03
Payer: MEDICARE

## 2024-03-03 VITALS
SYSTOLIC BLOOD PRESSURE: 106 MMHG | RESPIRATION RATE: 18 BRPM | OXYGEN SATURATION: 96 % | HEIGHT: 66 IN | BODY MASS INDEX: 37.93 KG/M2 | TEMPERATURE: 97.9 F | HEART RATE: 60 BPM | DIASTOLIC BLOOD PRESSURE: 73 MMHG | WEIGHT: 236 LBS

## 2024-03-03 DIAGNOSIS — R00.1 BRADYCARDIA: Primary | ICD-10-CM

## 2024-03-03 LAB
ANION GAP SERPL CALCULATED.3IONS-SCNC: 8 MMOL/L (ref 9–17)
BASOPHILS # BLD: 0.05 K/UL (ref 0–0.2)
BASOPHILS NFR BLD: 1 % (ref 0–2)
BUN SERPL-MCNC: 18 MG/DL (ref 8–23)
BUN/CREAT SERPL: 20 (ref 9–20)
CALCIUM SERPL-MCNC: 9.4 MG/DL (ref 8.6–10.4)
CHLORIDE SERPL-SCNC: 103 MMOL/L (ref 98–107)
CO2 SERPL-SCNC: 29 MMOL/L (ref 20–31)
CREAT SERPL-MCNC: 0.9 MG/DL (ref 0.5–0.9)
EOSINOPHIL # BLD: 0.1 K/UL (ref 0–0.44)
EOSINOPHILS RELATIVE PERCENT: 1 % (ref 1–4)
ERYTHROCYTE [DISTWIDTH] IN BLOOD BY AUTOMATED COUNT: 13.2 % (ref 11.8–14.4)
GFR SERPL CREATININE-BSD FRML MDRD: >60 ML/MIN/1.73M2
GLUCOSE SERPL-MCNC: 112 MG/DL (ref 70–99)
HCT VFR BLD AUTO: 55.7 % (ref 36.3–47.1)
HGB BLD-MCNC: 18.3 G/DL (ref 11.9–15.1)
IMM GRANULOCYTES # BLD AUTO: 0.01 K/UL (ref 0–0.3)
IMM GRANULOCYTES NFR BLD: 0 %
LYMPHOCYTES NFR BLD: 3.11 K/UL (ref 1.1–3.7)
LYMPHOCYTES RELATIVE PERCENT: 35 % (ref 24–43)
MCH RBC QN AUTO: 32 PG (ref 25.2–33.5)
MCHC RBC AUTO-ENTMCNC: 32.9 G/DL (ref 28.4–34.8)
MCV RBC AUTO: 97.4 FL (ref 82.6–102.9)
MONOCYTES NFR BLD: 0.78 K/UL (ref 0.1–1.2)
MONOCYTES NFR BLD: 9 % (ref 3–12)
NEUTROPHILS NFR BLD: 54 % (ref 36–65)
NEUTS SEG NFR BLD: 4.94 K/UL (ref 1.5–8.1)
NRBC BLD-RTO: 0 PER 100 WBC
PLATELET # BLD AUTO: 295 K/UL (ref 138–453)
PMV BLD AUTO: 9.2 FL (ref 8.1–13.5)
POTASSIUM SERPL-SCNC: 4.1 MMOL/L (ref 3.7–5.3)
RBC # BLD AUTO: 5.72 M/UL (ref 3.95–5.11)
SODIUM SERPL-SCNC: 140 MMOL/L (ref 135–144)
TROPONIN I SERPL HS-MCNC: 11 NG/L (ref 0–14)
TROPONIN I SERPL HS-MCNC: 15 NG/L (ref 0–14)
WBC OTHER # BLD: 9 K/UL (ref 3.5–11.3)

## 2024-03-03 PROCEDURE — 93005 ELECTROCARDIOGRAM TRACING: CPT

## 2024-03-03 PROCEDURE — 71045 X-RAY EXAM CHEST 1 VIEW: CPT

## 2024-03-03 PROCEDURE — 36415 COLL VENOUS BLD VENIPUNCTURE: CPT

## 2024-03-03 PROCEDURE — 2580000003 HC RX 258: Performed by: EMERGENCY MEDICINE

## 2024-03-03 PROCEDURE — 84484 ASSAY OF TROPONIN QUANT: CPT

## 2024-03-03 PROCEDURE — 80048 BASIC METABOLIC PNL TOTAL CA: CPT

## 2024-03-03 PROCEDURE — 99285 EMERGENCY DEPT VISIT HI MDM: CPT

## 2024-03-03 PROCEDURE — 85025 COMPLETE CBC W/AUTO DIFF WBC: CPT

## 2024-03-03 RX ORDER — 0.9 % SODIUM CHLORIDE 0.9 %
250 INTRAVENOUS SOLUTION INTRAVENOUS ONCE
Status: COMPLETED | OUTPATIENT
Start: 2024-03-03 | End: 2024-03-03

## 2024-03-03 RX ADMIN — SODIUM CHLORIDE 250 ML: 9 INJECTION, SOLUTION INTRAVENOUS at 10:10

## 2024-03-03 ASSESSMENT — PAIN - FUNCTIONAL ASSESSMENT: PAIN_FUNCTIONAL_ASSESSMENT: NONE - DENIES PAIN

## 2024-03-03 NOTE — ED NOTES
Troponin not ran from initial blood work taken at 0945 d/t duplicate orders. Frances in lab will run a troponin on that specimen (from 0945) now to compare to the sample (drawn at 1155) resulted at 1216.

## 2024-03-03 NOTE — ED PROVIDER NOTES
EMERGENCY DEPARTMENT ENCOUNTER    Pt Name: Jacki S Kehr  MRN: [id number]  Birthdate 1950  Date of evaluation: 3/3/24  CHIEF COMPLAINT       Chief Complaint   Patient presents with    Irregular Heart Beat     120s-39. Since this AM.     HISTORY OF PRESENT ILLNESS   The history is provided by the patient and medical records.    Patient is a 73-year-old female who presents to the ED for atrial fibrillation and bradycardia.  Symptoms started this morning after waking up from sleep.  She did not feel well.  Had a short run of palpitations, checked her watch and her heart rate was in the 120s for a few minutes.  It subsequently dropped down to 39 bpm, she became worried, and came in for further evaluation.  Upon arrival in the ED symptoms have resolved and heart rate is in the 50s and 60s which is her baseline.  Takes Eliquis for atrial fibrillation.    REVIEW OF SYSTEMS     Review of Systems  All other systems reviewed and are negative.    PASTMEDICAL HISTORY     Past Medical History:   Diagnosis Date    A-fib ([facility])     Hyperlipidemia     Hypertension      Past Problem List  Patient Active Problem List   Diagnosis Code    Acute diastolic heart failure ([facility]) I50.31    Atrial fibrillation with RVR ([facility]) I48.91    Chronic bronchitis ([facility]) J42    Class 3 severe obesity due to excess calories with serious comorbidity and body mass index (BMI) of 40.0 to 44.9 in adult ([facility]) E66.01, Z68.41    Mixed hyperlipidemia E78.2    Tobacco abuse Z72.0    Primary hypertension I10    High hematocrit R71.8     SURGICAL HISTORY       Past Surgical History:   Procedure Laterality Date    HERNIA REPAIR      HYSTERECTOMY (CERVIX STATUS UNKNOWN)       CURRENT MEDICATIONS       Previous Medications    ACETAMINOPHEN (TYLENOL) 500 MG TABLET    Take 1 tablet by mouth every 6 hours as needed for Pain    ALBUTEROL (ACCUNEB) 1.25 MG/3ML NEBULIZER SOLUTION    Inhale 3 mLs into the lungs every 6 hours as needed for Wheezing    ALBUTEROL SULFATE HFA

## 2024-03-03 NOTE — ED TRIAGE NOTES
Pt to ED from home via private auto for c/o irregular heartbeat onset this morning. Pt reports PMH of a-fib and reports she is in it \"7% of the time per week\" according to her smart watch. Pt reports compliance with medications. Pt also has hx of CHF. Pt denies chest pain, shortness of breath or dizziness. Pt concerned as this morning her heart rate was over 120 and then it dropped to the 40's/50's.

## 2024-03-03 NOTE — ED NOTES
Pt back from restroom, states she feels normal. Cardiac monitoring replaced. Pt updated on plan for second lab draw at 1145, verbalized understanding and denies further needs.

## 2024-03-04 LAB
EKG ATRIAL RATE: 58 BPM
EKG P AXIS: 29 DEGREES
EKG P-R INTERVAL: 142 MS
EKG Q-T INTERVAL: 466 MS
EKG QRS DURATION: 90 MS
EKG QTC CALCULATION (BAZETT): 457 MS
EKG R AXIS: 61 DEGREES
EKG T AXIS: 51 DEGREES
EKG VENTRICULAR RATE: 58 BPM